# Patient Record
Sex: MALE | Race: WHITE | URBAN - METROPOLITAN AREA
[De-identification: names, ages, dates, MRNs, and addresses within clinical notes are randomized per-mention and may not be internally consistent; named-entity substitution may affect disease eponyms.]

---

## 2019-09-12 ENCOUNTER — THERAPY VISIT (OUTPATIENT)
Dept: PHYSICAL THERAPY | Facility: CLINIC | Age: 69
End: 2019-09-12
Payer: COMMERCIAL

## 2019-09-12 DIAGNOSIS — M25.511 CHRONIC RIGHT SHOULDER PAIN: ICD-10-CM

## 2019-09-12 DIAGNOSIS — G89.29 CHRONIC RIGHT SHOULDER PAIN: ICD-10-CM

## 2019-09-12 DIAGNOSIS — M75.51 CHRONIC BURSITIS OF RIGHT SHOULDER: Primary | ICD-10-CM

## 2019-09-12 PROCEDURE — 97161 PT EVAL LOW COMPLEX 20 MIN: CPT | Mod: GP | Performed by: PHYSICAL THERAPIST

## 2019-09-12 PROCEDURE — 97110 THERAPEUTIC EXERCISES: CPT | Mod: GP | Performed by: PHYSICAL THERAPIST

## 2019-09-12 NOTE — PROGRESS NOTES
[facility] Initial Evaluation  Subjective:  The history is provided by the patient. No  was used.   Trent Conteh being seen for right should pain, I am looking for what is the best for my shoulders and if these exercises I have are good to continue with, .   Problem began 8/5/2018. Where condition occurred: during recreation / sport.Problem occurred: Exercising, initially heard a pop when lifting roughlly 1 year ago, felt weak but over time got better, in time I slipped on the ice during the winter and fell on the same shoulder, I did take over the counter medication which did help, overall I am feeling a lot better,    and reported as 1/10 on pain scale. General health as reported by patient is good. Pertinent medical history includes:  Smoking and migraines/headaches.   Other medical allergies details: penicilin, flu shot, statins.    Current medications:  Anti-depressants and sleep medication.   Primary job tasks include:  Pushing/pulling, repetitive tasks and lifting/carrying.  Pain is described as sharp (right para shoulder) and is intermittent. Pain timing: worse with specific movement. Since onset symptoms are gradually improving.      Patient is Retried . Work activity restrictions: retired.    Barriers include:  None as reported by patient.  Red flags:  None as reported by patient.                      Objective:  Posture: flat thoracic spine, posterior pelvic tilt, (B) winging scapulae, (B) rounded shoulders, forward head    Shoulder AROM: (B) WNL into flexion, abduction, IR, ER, and extension with no pain    Special Tests:   Impingement: (R) + (L): -  Hickey: (R) + (L): -   Lift off: (R) + (L): -   Empty Can: (R) + (L) -  Neer's: (R) - (L) -  Yergasons (R): - (L) -    Shoulder MMT:  IR: (R) weak and painful, familiar pain (L) weak and painless  ER: (R) weak and painless (L) weak and painless   Scaption: (R) weak and painful, familiar pain (L) weak and  painless    Assessment/Plan:    Patient is a 69 year old male with right side shoulder complaints. Patient demonstrated positive signs for shoulder impingement along with subscapularis tendinopathy secondary to chronicity of pain. Patient presents with poor posture, poor rotator cuff strength, and poor scapular stability. Patient to benefit from skilled physical therapy to address impairments for return to PLOF of lifting weight overhead and completing push/pull exercises pain free.    Patient has the following significant findings with corresponding treatment plan.                Diagnosis 1:  (R) shoulder bursitis  Pain -  electric stimulation, manual therapy, splint/taping/bracing/orthotics and self management  Decreased strength - therapeutic exercise, therapeutic activities and home program    Therapy Evaluation Codes:   Cumulative Therapy Evaluation is: Low complexity.    Previous and current functional limitations:  (See Goal Flow Sheet for this information)    Short term and Long term goals: (See Goal Flow Sheet for this information)     Communication ability:  Patient appears to be able to clearly communicate and understand verbal and written communication and follow directions correctly.  Treatment Explanation - The following has been discussed with the patient:   RX ordered/plan of care  Anticipated outcomes  Possible risks and side effects  This patient would benefit from PT intervention to resume normal activities.   Rehab potential is good.    Frequency:  1 X week, once daily  Duration:  for 4 weeks  Discharge Plan:  Achieve all LTG.  Independent in home treatment program.  Reach maximal therapeutic benefit.    Please refer to the daily flowsheet for treatment today, total treatment time and time spent performing 1:1 timed codes.

## 2019-09-12 NOTE — LETTER
/[facility]  [address] [address]  [phone]    2019    Re: Trent Conteh   :   1950  MRN:  [id number]   REFERRING PHYSICIAN:   Bindu Gaxiola    [facility]  Date of Initial Evaluation: 2019  Visits:  Rxs Used: 1  Reason for Referral:     Chronic bursitis of right shoulder  Chronic right shoulder pain    EVALUATION SUMMARY    [facility] Initial Evaluation  Subjective:  The history is provided by the patient. No  was used.   Trent Conteh being seen for right should pain, I am looking for what is the best for my shoulders and if these exercises I have are good to continue with, .   Problem began 2018. Where condition occurred: during recreation / sport.Problem occurred: Exercising, initially heard a pop when lifting roughlly 1 year ago, felt weak but over time got better, in time I slipped on the ice during the winter and fell on the same shoulder, I did take over the counter medication which did help, overall I am feeling a lot better,    and reported as 1/10 on pain scale. General health as reported by patient is good. Pertinent medical history includes:  Smoking and migraines/headaches.   Other medical allergies details: penicilin, flu shot, statins.    Current medications:  Anti-depressants and sleep medication.   Primary job tasks include:  Pushing/pulling, repetitive tasks and lifting/carrying.  Pain is described as sharp (right para shoulder) and is intermittent. Pain timing: worse with specific movement. Since onset symptoms are gradually improving.      Patient is Retried . Work activity restrictions: retired.    Barriers include:  None as reported by patient.  Red flags:  None as reported by patient.                    Objective:  Posture: flat thoracic spine, posterior pelvic tilt, (B) winging scapulae, (B) rounded shoulders,  forward head    Shoulder AROM: (B) WNL into flexion, abduction, IR, ER, and extension with no pain    Special Tests:   Impingement: (R) + (L): -  Hickey: (R) + (L): -   Lift off: (R) + (L): -   Empty Can: (R) + (L) -  Neer's: (R) - (L) -  Jessika (R): - (L) -  Re: Trent Conteh   :   1950    Shoulder MMT:  IR: (R) weak and painful, familiar pain (L) weak and painless  ER: (R) weak and painless (L) weak and painless   Scaption: (R) weak and painful, familiar pain (L) weak and painless    Assessment/Plan:    Patient is a 69 year old male with right side shoulder complaints. Patient demonstrated positive signs for shoulder impingement along with subscapularis tendinopathy secondary to chronicity of pain. Patient presents with poor posture, poor rotator cuff strength, and poor scapular stability. Patient to benefit from skilled physical therapy to address impairments for return to PLOF of lifting weight overhead and completing push/pull exercises pain free.    Patient has the following significant findings with corresponding treatment plan.                Diagnosis 1:  (R) shoulder bursitis  Pain -  electric stimulation, manual therapy, splint/taping/bracing/orthotics and self management  Decreased strength - therapeutic exercise, therapeutic activities and home program    Therapy Evaluation Codes:   Cumulative Therapy Evaluation is: Low complexity.    Previous and current functional limitations:  (See Goal Flow Sheet for this information)    Short term and Long term goals: (See Goal Flow Sheet for this information)     Communication ability:  Patient appears to be able to clearly communicate and understand verbal and written communication and follow directions correctly.  Treatment Explanation - The following has been discussed with the patient:   RX ordered/plan of care  Anticipated outcomes  Possible risks and side effects  This patient would benefit from PT intervention to resume normal activities.    Rehab potential is good.    Frequency:  1 X week, once daily  Duration:  for 4 weeks  Discharge Plan:  Achieve all LTG.  Independent in home treatment program.  Reach maximal therapeutic benefit.      Thank you for your referral.    INQUIRIES  Therapist: Mack Gallardo DPT  [facility] [address] [address]  Phone: [phone]  Fax: [phone]

## 2019-11-15 PROBLEM — M75.51 CHRONIC BURSITIS OF RIGHT SHOULDER: Status: RESOLVED | Noted: 2019-09-12 | Resolved: 2019-11-15

## 2019-11-15 PROBLEM — M25.511 RIGHT SHOULDER PAIN: Status: RESOLVED | Noted: 2019-09-12 | Resolved: 2019-11-15

## 2019-11-15 NOTE — PROGRESS NOTES
Discharge Note    Progress reporting period is from 09/12/2019 to Sep 12, 2019.     Trent failed to return for next follow up visit and current status is unknown.  Please see information below for last relevant information on current status.  Patient seen for Rxs Used: 1 visits.  SUBJECTIVE  Subjective changes noted by patient:  Subjective: I.E.  .  Current pain level is Current Pain level: 1/10.     Previous pain level was  Initial Pain level: 1/10.   Changes in function:  Yes (See Goal flowsheet attached for changes in current functional level)  Adverse reaction to treatment or activity: None    OBJECTIVE  Changes noted in objective findings: Objective: I.E.     ASSESSMENT/PLAN  Diagnosis: (R) shoulder bursitis   Updated problem list and treatment plan:   Pain - HEP  Decreased ROM/flexibility - HEP  Decreased function - HEP  Decreased strength - HEP  STG/LTGs have been met or progress has been made towards goals:  Yes, please see goal flowsheet for most current information  Assessment of Progress: current status is unknown.  Last current status:     Self Management Plans:  HEP  I have re-evaluated this patient and find that the nature, scope, duration and intensity of the therapy is appropriate for the medical condition of the patient.  Trent continues to require the following intervention to meet STG and LTG's:  HEP.    Recommendations:  Discharge with current home program.  Patient to follow up with MD as needed.    Please refer to the daily flowsheet for treatment today, total treatment time and time spent performing 1:1 timed codes.

## 2025-02-24 ENCOUNTER — MEDICAL CORRESPONDENCE (OUTPATIENT)
Dept: HEALTH INFORMATION MANAGEMENT | Facility: CLINIC | Age: 75
End: 2025-02-24